# Patient Record
Sex: MALE | Race: WHITE | NOT HISPANIC OR LATINO | Employment: OTHER | URBAN - METROPOLITAN AREA
[De-identification: names, ages, dates, MRNs, and addresses within clinical notes are randomized per-mention and may not be internally consistent; named-entity substitution may affect disease eponyms.]

---

## 2023-04-21 DIAGNOSIS — E78.5 HYPERLIPIDEMIA, UNSPECIFIED HYPERLIPIDEMIA TYPE: Primary | ICD-10-CM

## 2023-04-21 RX ORDER — ACETAMINOPHEN 500 MG
1 TABLET ORAL DAILY
COMMUNITY
Start: 2022-10-24 | End: 2023-06-15

## 2023-04-21 RX ORDER — PALIPERIDONE PALMITATE 234 MG/1.5ML
INJECTION INTRAMUSCULAR
COMMUNITY
Start: 2019-10-15

## 2023-04-21 RX ORDER — LUMATEPERONE 42 MG/1
1 CAPSULE ORAL DAILY
COMMUNITY
Start: 2022-07-11

## 2023-04-21 RX ORDER — ATORVASTATIN CALCIUM 20 MG/1
1 TABLET, FILM COATED ORAL DAILY
COMMUNITY
Start: 2019-10-15 | End: 2023-04-21 | Stop reason: SDUPTHER

## 2023-04-21 RX ORDER — ZOLPIDEM TARTRATE 10 MG/1
10 TABLET ORAL NIGHTLY PRN
COMMUNITY

## 2023-04-21 RX ORDER — ATORVASTATIN CALCIUM 20 MG/1
TABLET, FILM COATED ORAL
Qty: 90 TABLET | Refills: 0 | Status: SHIPPED | OUTPATIENT
Start: 2023-04-21 | End: 2023-08-21

## 2023-04-21 RX ORDER — DOXYLAMINE SUCCINATE 25 MG
TABLET ORAL
COMMUNITY
Start: 2023-01-10

## 2023-04-21 RX ORDER — LORAZEPAM 0.5 MG/1
TABLET ORAL
COMMUNITY

## 2023-04-21 RX ORDER — ESOMEPRAZOLE MAGNESIUM 40 MG/1
40 CAPSULE, DELAYED RELEASE ORAL DAILY PRN
COMMUNITY
End: 2024-01-17

## 2023-04-21 RX ORDER — LURASIDONE HYDROCHLORIDE 120 MG/1
TABLET, FILM COATED ORAL
COMMUNITY
Start: 2023-03-28

## 2023-04-21 RX ORDER — IBUPROFEN 200 MG
TABLET ORAL
COMMUNITY
Start: 2023-01-11 | End: 2024-02-07 | Stop reason: ALTCHOICE

## 2023-06-14 DIAGNOSIS — E55.9 VITAMIN D INSUFFICIENCY: Primary | ICD-10-CM

## 2023-06-15 PROBLEM — R00.0 TACHYCARDIA: Status: ACTIVE | Noted: 2023-06-15

## 2023-06-15 PROBLEM — R63.4 ABNORMAL WEIGHT LOSS: Status: ACTIVE | Noted: 2023-06-15

## 2023-06-15 PROBLEM — D75.1 ERYTHROCYTOSIS: Status: ACTIVE | Noted: 2023-06-15

## 2023-06-15 PROBLEM — M79.662 BILATERAL CALF PAIN: Status: ACTIVE | Noted: 2023-06-15

## 2023-06-15 PROBLEM — R21 RASH AND OTHER NONSPECIFIC SKIN ERUPTION: Status: ACTIVE | Noted: 2023-06-15

## 2023-06-15 PROBLEM — F20.0: Status: ACTIVE | Noted: 2023-06-15

## 2023-06-15 PROBLEM — L02.419 ABSCESS OF LOWER EXTREMITY: Status: ACTIVE | Noted: 2023-06-15

## 2023-06-15 PROBLEM — K04.7 INFECTED TOOTH: Status: ACTIVE | Noted: 2023-06-15

## 2023-06-15 PROBLEM — E55.9 VITAMIN D INSUFFICIENCY: Status: ACTIVE | Noted: 2023-06-15

## 2023-06-15 PROBLEM — K21.00 GASTROESOPHAGEAL REFLUX DISEASE WITH ESOPHAGITIS: Status: ACTIVE | Noted: 2023-06-15

## 2023-06-15 PROBLEM — R74.8 ALKALINE PHOSPHATASE ELEVATION: Status: ACTIVE | Noted: 2023-06-15

## 2023-06-15 PROBLEM — D72.829 LEUKOCYTOSIS: Status: ACTIVE | Noted: 2023-06-15

## 2023-06-15 PROBLEM — M79.661 BILATERAL CALF PAIN: Status: ACTIVE | Noted: 2023-06-15

## 2023-06-15 PROBLEM — E78.5 HYPERLIPIDEMIA: Status: ACTIVE | Noted: 2023-06-15

## 2023-06-15 PROBLEM — M79.604 ACUTE PAIN OF RIGHT LOWER EXTREMITY: Status: ACTIVE | Noted: 2023-06-15

## 2023-06-15 PROBLEM — M71.20 POPLITEAL CYST: Status: ACTIVE | Noted: 2023-06-15

## 2023-06-15 PROBLEM — B35.1 ONYCHOMYCOSIS DUE TO DERMATOPHYTE: Status: ACTIVE | Noted: 2023-06-15

## 2023-06-15 RX ORDER — NICOTINE 11MG/24HR
PATCH, TRANSDERMAL 24 HOURS TRANSDERMAL
Qty: 90 CAPSULE | Refills: 1 | Status: SHIPPED | OUTPATIENT
Start: 2023-06-15 | End: 2023-07-03 | Stop reason: SDUPTHER

## 2023-07-03 ENCOUNTER — OFFICE VISIT (OUTPATIENT)
Dept: PRIMARY CARE | Facility: CLINIC | Age: 48
End: 2023-07-03
Payer: COMMERCIAL

## 2023-07-03 VITALS
BODY MASS INDEX: 22.62 KG/M2 | DIASTOLIC BLOOD PRESSURE: 75 MMHG | HEART RATE: 85 BPM | OXYGEN SATURATION: 99 % | SYSTOLIC BLOOD PRESSURE: 120 MMHG | HEIGHT: 72 IN | TEMPERATURE: 97.5 F | WEIGHT: 167 LBS

## 2023-07-03 DIAGNOSIS — E55.9 VITAMIN D INSUFFICIENCY: ICD-10-CM

## 2023-07-03 DIAGNOSIS — J44.9 OBSTRUCTIVE LUNG DISEASE (MULTI): ICD-10-CM

## 2023-07-03 DIAGNOSIS — F20.0: ICD-10-CM

## 2023-07-03 DIAGNOSIS — E78.5 HYPERLIPIDEMIA, UNSPECIFIED HYPERLIPIDEMIA TYPE: Primary | ICD-10-CM

## 2023-07-03 PROCEDURE — 99214 OFFICE O/P EST MOD 30 MIN: CPT | Performed by: INTERNAL MEDICINE

## 2023-07-03 RX ORDER — ACETAMINOPHEN 500 MG
50 TABLET ORAL DAILY
Qty: 90 CAPSULE | Refills: 1 | Status: SHIPPED | OUTPATIENT
Start: 2023-07-03 | End: 2024-02-07 | Stop reason: SDUPTHER

## 2023-07-03 SDOH — ECONOMIC STABILITY: FOOD INSECURITY: WITHIN THE PAST 12 MONTHS, YOU WORRIED THAT YOUR FOOD WOULD RUN OUT BEFORE YOU GOT MONEY TO BUY MORE.: NEVER TRUE

## 2023-07-03 SDOH — ECONOMIC STABILITY: FOOD INSECURITY: WITHIN THE PAST 12 MONTHS, THE FOOD YOU BOUGHT JUST DIDN'T LAST AND YOU DIDN'T HAVE MONEY TO GET MORE.: NEVER TRUE

## 2023-07-03 ASSESSMENT — PATIENT HEALTH QUESTIONNAIRE - PHQ9
2. FEELING DOWN, DEPRESSED OR HOPELESS: NOT AT ALL
SUM OF ALL RESPONSES TO PHQ9 QUESTIONS 1 & 2: 0
1. LITTLE INTEREST OR PLEASURE IN DOING THINGS: NOT AT ALL

## 2023-07-03 ASSESSMENT — LIFESTYLE VARIABLES
AUDIT-C TOTAL SCORE: 0
SKIP TO QUESTIONS 9-10: 1
HOW MANY STANDARD DRINKS CONTAINING ALCOHOL DO YOU HAVE ON A TYPICAL DAY: PATIENT DOES NOT DRINK
HOW OFTEN DO YOU HAVE SIX OR MORE DRINKS ON ONE OCCASION: NEVER
HOW OFTEN DO YOU HAVE A DRINK CONTAINING ALCOHOL: NEVER

## 2023-07-03 ASSESSMENT — PAIN SCALES - GENERAL: PAINLEVEL: 0-NO PAIN

## 2023-07-03 NOTE — PROGRESS NOTES
Chief Complaint/HPI:    hyperlipidemia: takes Lipitor daily, refills requested      schizophrenia: gets meds at [facility]. He takes Invega, Caplyta, lorazepam and Ambien, psychiatrist has discussed use of energy drinks also. Patient still smokes, he does admit to continuing use of energy drinks    Patient denies any breathing issues, he is due for screening colonoscopy. I advised the patient that his mother was concerned about lung issues since he has smoked for many years, he still smokes 1 1/2 ppd      GERD: patient takes esomeprazole, he smokes, he has no issues today and reports improvement in his acid reflux, refills requested        ROS otherwise negative aside from what was mentioned above in HPI.      Patient Active Problem List   Diagnosis    Abnormal weight loss    Abscess of lower extremity    Acute pain of right lower extremity    Alkaline phosphatase elevation    Bilateral calf pain    Erythrocytosis    Gastroesophageal reflux disease with esophagitis    Hyperlipidemia    Infected tooth    Leukocytosis    Onychomycosis due to dermatophyte    Paranoid type schizophrenia (CMS/HCC)    Popliteal cyst    Rash and other nonspecific skin eruption    Tachycardia    Vitamin D insufficiency    Obstructive lung disease (CMS/HCC)         Past Medical History:   Diagnosis Date    Schizophrenia, unspecified (CMS/HCC)     Schizophrenic disorder     Past Surgical History:   Procedure Laterality Date    OTHER SURGICAL HISTORY  10/15/2019    Hand surgery    OTHER SURGICAL HISTORY  10/15/2019    Brain surgery     Social History     Social History Narrative    Not on file         ALLERGIES  Doxycycline and Sulfamethoxazole-trimethoprim      MEDICATIONS  Current Outpatient Medications on File Prior to Visit   Medication Sig Dispense Refill    atorvastatin (Lipitor) 20 mg tablet TAKE 1 TABLET BY MOUTH DAILY 90 tablet 0    Caplyta 42 mg capsule Take 1 capsule (42 mg) by mouth once daily.      esomeprazole (NexIUM) 40 mg  DR capsule Take 1 capsule (40 mg) by mouth once daily as needed.      Invega Sustenna 234 mg/1.5 mL syringe Inject into the shoulder, thigh, or buttocks.      LORazepam (Ativan) 0.5 mg tablet TAKE 1 TABLET BY MOUTH ONCE A DAY AS NEEDED FOR ANXIETY      lurasidone (Latuda) 120 mg tablet TAKE 1 TABLET BY MOUTH EVERY DAY WITH MEALS      miconazole (Micotin) 2 % cream APPLY SPARINGLY TOPICALLY TO THE AFFECTED AREA TWICE DAILY      zolpidem (Ambien) 10 mg tablet Take 1 tablet (10 mg) by mouth as needed at bedtime for sleep.      [DISCONTINUED] Vitamin D3 50 mcg (2,000 unit) capsule TAKE 1 CAPSULE BY MOUTH DAILY 90 capsule 1    nicotine (Nicoderm CQ) 21 mg/24 hr patch APPLY 1 PATCH TOPICALLY TO THE SKIN DAILY AS DIRECTED       No current facility-administered medications on file prior to visit.         PHYSICAL EXAM  /75 (BP Location: Right arm, Patient Position: Sitting, BP Cuff Size: Adult)   Pulse 85   Temp 36.4 °C (97.5 °F) (Temporal)   Ht 1.829 m (6')   Wt 75.8 kg (167 lb)   SpO2 99%   BMI 22.65 kg/m²   Body mass index is 22.65 kg/m².  CONSTITUTIONAL - looks older than stated age, not in acute distress, odor of smoke noted     HEAD - no trauma, normocephalic      EYES - extraocular muscles are intact, infraorbital edema bilaterally     Ears/ NECK - supple without rigidity, no neck mass was observed, no thyromegaly or thyroid nodules,  auricles are intact     CHEST - clear to auscultation, diminished breath sounds throughout, no wheezing, no crackles and no rales, good effort     CARDIAC -tachycardic with normal tye, no skipped beats, no murmur     EXTREMITIES - no edema, ring shaped rash with central clearing on left anterior lower leg, lesion is about 3 cm in diameter, right index fingernail burnt / brown appearing (cigarette smoking per patient)     NEUROLOGICAL - alert, oriented and no focal signs     PSYCHIATRIC - alert, but anxious, mood and affect appear congruent      IMMUNOLOGIC - no cervical  lymphadenopathy   ASSESSMENT/PLAN  Problem List Items Addressed This Visit       Hyperlipidemia - Primary    Relevant Orders    Comprehensive metabolic panel    Lipid panel    Paranoid type schizophrenia (CMS/HCC)    Current Assessment & Plan     Follow up with psychiatry as directed, encourage avoidance of use of energy drinks         Relevant Orders    Comprehensive metabolic panel    Vitamin D insufficiency    Relevant Medications    cholecalciferol (Vitamin D3) 50 mcg (2,000 unit) capsule    Other Relevant Orders    Comprehensive metabolic panel    Vitamin D 25-Hydroxy,Total    Obstructive lung disease (CMS/HCC)    Current Assessment & Plan     Patient does have a long smoking history, he does have diminished breath sounds consistent with obstructive lung disease, check PFTs and a chest x ray, he is too young to qualify for lung cancer screening, he has at least a 45 pack year history of smoking at this time (1.5 ppd x 30 years). Will order screening lung CT when the patient is 52 years old, advise smoking cessation again         Relevant Orders    Comprehensive metabolic panel    Pulmonary function testing (Ancillary Performed)    XR chest 2 views     Check labs, PFTs , CXR, recommend getting screening colonoscopy completed, encourage avoidance of energy drinks, encourage smoking cessation.     Follow up in about 6 months   Patrick Anne MD

## 2023-07-03 NOTE — ASSESSMENT & PLAN NOTE
Patient does have a long smoking history, he does have diminished breath sounds consistent with obstructive lung disease, check PFTs and a chest x ray, he is too young to qualify for lung cancer screening, he has at least a 45 pack year history of smoking at this time (1.5 ppd x 30 years). Will order screening lung CT when the patient is 52 years old, advise smoking cessation again

## 2023-07-21 ENCOUNTER — LAB (OUTPATIENT)
Dept: LAB | Facility: LAB | Age: 48
End: 2023-07-21
Payer: COMMERCIAL

## 2023-07-21 DIAGNOSIS — F20.0: ICD-10-CM

## 2023-07-21 DIAGNOSIS — J44.9 OBSTRUCTIVE LUNG DISEASE (MULTI): ICD-10-CM

## 2023-07-21 DIAGNOSIS — S22.000A COMPRESSION FRACTURE OF THORACIC VERTEBRA, UNSPECIFIED THORACIC VERTEBRAL LEVEL, INITIAL ENCOUNTER (MULTI): Primary | ICD-10-CM

## 2023-07-21 DIAGNOSIS — E55.9 VITAMIN D INSUFFICIENCY: ICD-10-CM

## 2023-07-21 DIAGNOSIS — E78.5 HYPERLIPIDEMIA, UNSPECIFIED HYPERLIPIDEMIA TYPE: ICD-10-CM

## 2023-07-21 LAB
ALANINE AMINOTRANSFERASE (SGPT) (U/L) IN SER/PLAS: 14 U/L (ref 10–52)
ALBUMIN (G/DL) IN SER/PLAS: 4.1 G/DL (ref 3.4–5)
ALKALINE PHOSPHATASE (U/L) IN SER/PLAS: 108 U/L (ref 33–120)
ANION GAP IN SER/PLAS: 10 MMOL/L (ref 10–20)
ASPARTATE AMINOTRANSFERASE (SGOT) (U/L) IN SER/PLAS: 13 U/L (ref 9–39)
BILIRUBIN TOTAL (MG/DL) IN SER/PLAS: 0.3 MG/DL (ref 0–1.2)
CALCIDIOL (25 OH VITAMIN D3) (NG/ML) IN SER/PLAS: 36 NG/ML
CALCIUM (MG/DL) IN SER/PLAS: 8.9 MG/DL (ref 8.6–10.3)
CARBON DIOXIDE, TOTAL (MMOL/L) IN SER/PLAS: 29 MMOL/L (ref 21–32)
CHLORIDE (MMOL/L) IN SER/PLAS: 104 MMOL/L (ref 98–107)
CHOLESTEROL (MG/DL) IN SER/PLAS: 119 MG/DL (ref 0–199)
CHOLESTEROL IN HDL (MG/DL) IN SER/PLAS: 34.7 MG/DL
CHOLESTEROL/HDL RATIO: 3.4
CREATININE (MG/DL) IN SER/PLAS: 0.82 MG/DL (ref 0.5–1.3)
GFR MALE: >90 ML/MIN/1.73M2
GLUCOSE (MG/DL) IN SER/PLAS: 74 MG/DL (ref 74–99)
LDL: 57 MG/DL (ref 0–99)
POTASSIUM (MMOL/L) IN SER/PLAS: 4 MMOL/L (ref 3.5–5.3)
PROTEIN TOTAL: 6.3 G/DL (ref 6.4–8.2)
SODIUM (MMOL/L) IN SER/PLAS: 139 MMOL/L (ref 136–145)
TRIGLYCERIDE (MG/DL) IN SER/PLAS: 139 MG/DL (ref 0–149)
UREA NITROGEN (MG/DL) IN SER/PLAS: 5 MG/DL (ref 6–23)
VLDL: 28 MG/DL (ref 0–40)

## 2023-07-21 PROCEDURE — 80061 LIPID PANEL: CPT

## 2023-07-21 PROCEDURE — 80053 COMPREHEN METABOLIC PANEL: CPT

## 2023-07-21 PROCEDURE — 82306 VITAMIN D 25 HYDROXY: CPT

## 2023-07-21 PROCEDURE — 36415 COLL VENOUS BLD VENIPUNCTURE: CPT

## 2023-08-07 ENCOUNTER — TELEPHONE (OUTPATIENT)
Dept: SCHEDULING | Age: 48
End: 2023-08-07

## 2023-08-21 DIAGNOSIS — E78.5 HYPERLIPIDEMIA, UNSPECIFIED HYPERLIPIDEMIA TYPE: ICD-10-CM

## 2023-08-21 RX ORDER — IBUPROFEN 800 MG/1
800 TABLET ORAL EVERY 6 HOURS PRN
COMMUNITY
Start: 2023-08-03

## 2023-08-21 RX ORDER — ATORVASTATIN CALCIUM 20 MG/1
TABLET, FILM COATED ORAL
Qty: 90 TABLET | Refills: 0 | Status: SHIPPED | OUTPATIENT
Start: 2023-08-21 | End: 2023-12-28

## 2023-11-20 ENCOUNTER — TELEPHONE (OUTPATIENT)
Dept: PRIMARY CARE | Facility: CLINIC | Age: 48
End: 2023-11-20
Payer: COMMERCIAL

## 2023-11-20 NOTE — TELEPHONE ENCOUNTER
Patient is calling for a new script for AMOXICILLIN FOR DENTAL PROBLEMS    Patient states Dr HARPER has filled this in the past.    [id number] - [place], [place] - [address] AT [facility]

## 2023-11-28 NOTE — TELEPHONE ENCOUNTER
Talked with Jessy Watson and she said that he no longer will need the Amoxicillin.  He was just wanted it in case  an infection did occur.

## 2023-12-27 DIAGNOSIS — E78.5 HYPERLIPIDEMIA, UNSPECIFIED HYPERLIPIDEMIA TYPE: ICD-10-CM

## 2023-12-28 RX ORDER — ATORVASTATIN CALCIUM 20 MG/1
TABLET, FILM COATED ORAL
Qty: 90 TABLET | Refills: 0 | Status: SHIPPED | OUTPATIENT
Start: 2023-12-28 | End: 2024-04-23

## 2024-01-02 ENCOUNTER — APPOINTMENT (OUTPATIENT)
Dept: PRIMARY CARE | Facility: CLINIC | Age: 49
End: 2024-01-02
Payer: COMMERCIAL

## 2024-01-03 ENCOUNTER — APPOINTMENT (OUTPATIENT)
Dept: PRIMARY CARE | Facility: CLINIC | Age: 49
End: 2024-01-03
Payer: COMMERCIAL

## 2024-01-17 DIAGNOSIS — K21.00 GASTROESOPHAGEAL REFLUX DISEASE WITH ESOPHAGITIS, UNSPECIFIED WHETHER HEMORRHAGE: Primary | ICD-10-CM

## 2024-01-17 RX ORDER — ESOMEPRAZOLE MAGNESIUM 40 MG/1
40 CAPSULE, DELAYED RELEASE ORAL DAILY PRN
Qty: 90 CAPSULE | Refills: 1 | Status: SHIPPED | OUTPATIENT
Start: 2024-01-17

## 2024-02-07 ENCOUNTER — OFFICE VISIT (OUTPATIENT)
Dept: PRIMARY CARE | Facility: CLINIC | Age: 49
End: 2024-02-07
Payer: COMMERCIAL

## 2024-02-07 VITALS
BODY MASS INDEX: 22.75 KG/M2 | RESPIRATION RATE: 16 BRPM | HEART RATE: 127 BPM | DIASTOLIC BLOOD PRESSURE: 81 MMHG | WEIGHT: 168 LBS | HEIGHT: 72 IN | SYSTOLIC BLOOD PRESSURE: 131 MMHG | TEMPERATURE: 97.6 F | OXYGEN SATURATION: 97 %

## 2024-02-07 DIAGNOSIS — E55.9 VITAMIN D INSUFFICIENCY: ICD-10-CM

## 2024-02-07 DIAGNOSIS — Z12.11 COLON CANCER SCREENING: ICD-10-CM

## 2024-02-07 DIAGNOSIS — E78.5 HYPERLIPIDEMIA, UNSPECIFIED HYPERLIPIDEMIA TYPE: Primary | ICD-10-CM

## 2024-02-07 DIAGNOSIS — R00.0 TACHYCARDIA: ICD-10-CM

## 2024-02-07 DIAGNOSIS — J44.9 OBSTRUCTIVE LUNG DISEASE (MULTI): ICD-10-CM

## 2024-02-07 DIAGNOSIS — F20.0: ICD-10-CM

## 2024-02-07 PROCEDURE — 4004F PT TOBACCO SCREEN RCVD TLK: CPT | Performed by: INTERNAL MEDICINE

## 2024-02-07 PROCEDURE — G0009 ADMIN PNEUMOCOCCAL VACCINE: HCPCS | Performed by: INTERNAL MEDICINE

## 2024-02-07 PROCEDURE — 90715 TDAP VACCINE 7 YRS/> IM: CPT | Performed by: INTERNAL MEDICINE

## 2024-02-07 PROCEDURE — 90471 IMMUNIZATION ADMIN: CPT | Performed by: INTERNAL MEDICINE

## 2024-02-07 PROCEDURE — 99214 OFFICE O/P EST MOD 30 MIN: CPT | Performed by: INTERNAL MEDICINE

## 2024-02-07 PROCEDURE — 90677 PCV20 VACCINE IM: CPT | Performed by: INTERNAL MEDICINE

## 2024-02-07 RX ORDER — ACETAMINOPHEN 500 MG
2000 TABLET ORAL DAILY
Qty: 90 CAPSULE | Refills: 1 | Status: SHIPPED | OUTPATIENT
Start: 2024-02-07

## 2024-02-07 SDOH — ECONOMIC STABILITY: FOOD INSECURITY: WITHIN THE PAST 12 MONTHS, YOU WORRIED THAT YOUR FOOD WOULD RUN OUT BEFORE YOU GOT MONEY TO BUY MORE.: NEVER TRUE

## 2024-02-07 SDOH — ECONOMIC STABILITY: FOOD INSECURITY: WITHIN THE PAST 12 MONTHS, THE FOOD YOU BOUGHT JUST DIDN'T LAST AND YOU DIDN'T HAVE MONEY TO GET MORE.: NEVER TRUE

## 2024-02-07 ASSESSMENT — LIFESTYLE VARIABLES
HOW MANY STANDARD DRINKS CONTAINING ALCOHOL DO YOU HAVE ON A TYPICAL DAY: PATIENT DOES NOT DRINK
HOW OFTEN DO YOU HAVE A DRINK CONTAINING ALCOHOL: NEVER
AUDIT-C TOTAL SCORE: 0
HOW OFTEN DO YOU HAVE SIX OR MORE DRINKS ON ONE OCCASION: NEVER
SKIP TO QUESTIONS 9-10: 1

## 2024-02-07 ASSESSMENT — PATIENT HEALTH QUESTIONNAIRE - PHQ9
1. LITTLE INTEREST OR PLEASURE IN DOING THINGS: NOT AT ALL
SUM OF ALL RESPONSES TO PHQ9 QUESTIONS 1 & 2: 0
2. FEELING DOWN, DEPRESSED OR HOPELESS: NOT AT ALL

## 2024-02-07 NOTE — ASSESSMENT & PLAN NOTE
Patient goes to [facility], he takes multiple meds as noted. Again encourage the patient to decrease consumption of energy drinks, he is tachycardic. Will check thyroid studies also

## 2024-02-07 NOTE — PROGRESS NOTES
"Chief Complaint/HPI:    hyperlipidemia: takes Lipitor daily,      Vitamin d insufficiency: patient takes vitamin d     schizophrenia: gets meds at [facility]. He takes Invega, Caplyta, lorazepam, Latuda,  and Ambien, psychiatrist has discussed use of energy drinks also. Patient still smokes, he does admit to continuing use of energy drinks . Patient states that \"smoking calms me down\"     Patient denies any breathing issues, he is due for screening colonoscopy. I advised the patient that his mother was concerned about lung issues since he has smoked for many years, he still smokes 1 1/2 ppd      GERD: patient takes esomeprazole, he smokes, he has no issues today and reports improvement in his acid reflux    ROS otherwise negative aside from what was mentioned above in HPI.      Patient Active Problem List   Diagnosis    Abnormal weight loss    Abscess of lower extremity    Acute pain of right lower extremity    Alkaline phosphatase elevation    Bilateral calf pain    Erythrocytosis    Gastroesophageal reflux disease with esophagitis    Hyperlipidemia    Infected tooth    Leukocytosis    Onychomycosis due to dermatophyte    Paranoid type schizophrenia (CMS/HCC)    Popliteal cyst    Rash and other nonspecific skin eruption    Tachycardia    Vitamin D insufficiency    Obstructive lung disease (CMS/HCC)         Past Medical History:   Diagnosis Date    Schizophrenia, unspecified (CMS/HCC)     Schizophrenic disorder     Past Surgical History:   Procedure Laterality Date    OTHER SURGICAL HISTORY  10/15/2019    Hand surgery    OTHER SURGICAL HISTORY  10/15/2019    Brain surgery     Social History     Social History Narrative    Not on file         ALLERGIES  Doxycycline and Sulfamethoxazole-trimethoprim      MEDICATIONS  Current Outpatient Medications on File Prior to Visit   Medication Sig Dispense Refill    atorvastatin (Lipitor) 20 mg tablet TAKE 1 TABLET BY MOUTH DAILY 90 tablet 0    Caplyta 42 mg capsule Take 1 " capsule (42 mg) by mouth once daily.      esomeprazole (NexIUM) 40 mg DR capsule TAKE 1 CAPSULE BY MOUTH DAILY AS NEEDED 90 capsule 1    ibuprofen 800 mg tablet Take 1 tablet (800 mg) by mouth every 6 hours if needed for mild pain (1 - 3) or moderate pain (4 - 6).      Invega Sustenna 234 mg/1.5 mL syringe Inject into the shoulder, thigh, or buttocks.      LORazepam (Ativan) 0.5 mg tablet TAKE 1 TABLET BY MOUTH ONCE A DAY AS NEEDED FOR ANXIETY      lurasidone (Latuda) 120 mg tablet TAKE 1 TABLET BY MOUTH EVERY DAY WITH MEALS      miconazole (Micotin) 2 % cream APPLY SPARINGLY TOPICALLY TO THE AFFECTED AREA TWICE DAILY      zolpidem (Ambien) 10 mg tablet Take 1 tablet (10 mg) by mouth as needed at bedtime for sleep.      [DISCONTINUED] cholecalciferol (Vitamin D3) 50 mcg (2,000 unit) capsule Take 1 capsule (50 mcg) by mouth once daily. 90 capsule 1    [DISCONTINUED] nicotine (Nicoderm CQ) 21 mg/24 hr patch APPLY 1 PATCH TOPICALLY TO THE SKIN DAILY AS DIRECTED       No current facility-administered medications on file prior to visit.         PHYSICAL EXAM  /81 (BP Location: Left arm, Patient Position: Sitting, BP Cuff Size: Adult)   Pulse (!) 127   Temp 36.4 °C (97.6 °F)   Resp 16   Ht 1.829 m (6')   Wt 76.2 kg (168 lb)   SpO2 97%   BMI 22.78 kg/m²   Body mass index is 22.78 kg/m².  CONSTITUTIONAL - looks older than stated age, not in acute distress, odor of smoke noted, he moves constantly about the exam room today, patient does talk to himself during the exam     HEAD - no trauma, normocephalic      EYES - extraocular muscles are intact      Ears/ NECK - supple without rigidity, no neck mass was observed, no thyromegaly or thyroid nodules,  auricles are intact     CHEST - clear to auscultation, diminished breath sounds throughout, no wheezing, no crackles and no rales, good effort     CARDIAC -tachycardic with normal rhythm, no skipped beats, no murmur noted     EXTREMITIES - no edema, right index  fingernail  / brown appearing (cigarette smoking per patient)     NEUROLOGICAL - alert, oriented and no focal signs, patellar reflexes are 3+ bilaterally     PSYCHIATRIC - alert, but anxious, he moves frequently about in the exam room     IMMUNOLOGIC - no cervical lymphadenopathy     ASSESSMENT/PLAN  Problem List Items Addressed This Visit       Hyperlipidemia - Primary    Current Assessment & Plan     Continue atorvastatin, check labs          Relevant Orders    Albumin , Urine Random    CBC and Auto Differential    Comprehensive Metabolic Panel    Lipid Panel    Paranoid type schizophrenia (CMS/HCC)    Current Assessment & Plan     Patient goes to [facility], he takes multiple meds as noted. Again encourage the patient to decrease consumption of energy drinks, he is tachycardic. Will check thyroid studies also         Relevant Orders    TSH with reflex to Free T4 if abnormal    Albumin , Urine Random    CBC and Auto Differential    Comprehensive Metabolic Panel    Tachycardia    Current Assessment & Plan     Check TSH, check CMP and CBC also         Relevant Orders    TSH with reflex to Free T4 if abnormal    Albumin , Urine Random    CBC and Auto Differential    Comprehensive Metabolic Panel    Vitamin D insufficiency    Current Assessment & Plan     Continue vitamin d, check labs         Relevant Medications    cholecalciferol (Vitamin D3) 50 mcg (2,000 unit) capsule    Other Relevant Orders    Vitamin D 25-Hydroxy,Total (for eval of Vitamin D levels)    Albumin , Urine Random    CBC and Auto Differential    Comprehensive Metabolic Panel    Obstructive lung disease (CMS/HCC)    Current Assessment & Plan     He continues to smoke, again recommend smoking cessation         Relevant Orders    Albumin , Urine Random    CBC and Auto Differential    Comprehensive Metabolic Panel     Other Visit Diagnoses       Colon cancer screening        Relevant Orders    Colonoscopy Screening; Average Risk Patient           Check labs, recommend screening colonoscopy, this may be difficult for the patient due to current psychiatric issues. TDAP and Prevnar 20 vaccines ordered    Patrick Anne MD

## 2024-03-29 ENCOUNTER — LAB (OUTPATIENT)
Dept: LAB | Facility: LAB | Age: 49
End: 2024-03-29
Payer: COMMERCIAL

## 2024-03-29 DIAGNOSIS — J44.9 OBSTRUCTIVE LUNG DISEASE (MULTI): ICD-10-CM

## 2024-03-29 DIAGNOSIS — E55.9 VITAMIN D INSUFFICIENCY: ICD-10-CM

## 2024-03-29 DIAGNOSIS — R00.0 TACHYCARDIA: ICD-10-CM

## 2024-03-29 DIAGNOSIS — E78.5 HYPERLIPIDEMIA, UNSPECIFIED HYPERLIPIDEMIA TYPE: ICD-10-CM

## 2024-03-29 DIAGNOSIS — F20.0: ICD-10-CM

## 2024-03-29 LAB
25(OH)D3 SERPL-MCNC: 43 NG/ML (ref 30–100)
ALBUMIN SERPL BCP-MCNC: 4.6 G/DL (ref 3.4–5)
ALP SERPL-CCNC: 120 U/L (ref 33–120)
ALT SERPL W P-5'-P-CCNC: 14 U/L (ref 10–52)
ANION GAP SERPL CALC-SCNC: 12 MMOL/L (ref 10–20)
AST SERPL W P-5'-P-CCNC: 14 U/L (ref 9–39)
BASOPHILS # BLD AUTO: 0.02 X10*3/UL (ref 0–0.1)
BASOPHILS NFR BLD AUTO: 0.1 %
BILIRUB SERPL-MCNC: 0.6 MG/DL (ref 0–1.2)
BUN SERPL-MCNC: 6 MG/DL (ref 6–23)
CALCIUM SERPL-MCNC: 9.5 MG/DL (ref 8.6–10.3)
CHLORIDE SERPL-SCNC: 102 MMOL/L (ref 98–107)
CHOLEST SERPL-MCNC: 130 MG/DL (ref 0–199)
CHOLESTEROL/HDL RATIO: 4.3
CO2 SERPL-SCNC: 28 MMOL/L (ref 21–32)
CREAT SERPL-MCNC: 0.81 MG/DL (ref 0.5–1.3)
CREAT UR-MCNC: 114.6 MG/DL (ref 20–370)
EGFRCR SERPLBLD CKD-EPI 2021: >90 ML/MIN/1.73M*2
EOSINOPHIL # BLD AUTO: 0.05 X10*3/UL (ref 0–0.7)
EOSINOPHIL NFR BLD AUTO: 0.3 %
ERYTHROCYTE [DISTWIDTH] IN BLOOD BY AUTOMATED COUNT: 13.1 % (ref 11.5–14.5)
GLUCOSE SERPL-MCNC: 107 MG/DL (ref 74–99)
HCT VFR BLD AUTO: 53 % (ref 41–52)
HDLC SERPL-MCNC: 30.4 MG/DL
HGB BLD-MCNC: 18.3 G/DL (ref 13.5–17.5)
IMM GRANULOCYTES # BLD AUTO: 0.07 X10*3/UL (ref 0–0.7)
IMM GRANULOCYTES NFR BLD AUTO: 0.5 % (ref 0–0.9)
LDLC SERPL CALC-MCNC: 73 MG/DL
LYMPHOCYTES # BLD AUTO: 2.31 X10*3/UL (ref 1.2–4.8)
LYMPHOCYTES NFR BLD AUTO: 14.9 %
MCH RBC QN AUTO: 31.3 PG (ref 26–34)
MCHC RBC AUTO-ENTMCNC: 34.5 G/DL (ref 32–36)
MCV RBC AUTO: 91 FL (ref 80–100)
MICROALBUMIN UR-MCNC: 8.7 MG/L
MICROALBUMIN/CREAT UR: 7.6 UG/MG CREAT
MONOCYTES # BLD AUTO: 1.19 X10*3/UL (ref 0.1–1)
MONOCYTES NFR BLD AUTO: 7.7 %
NEUTROPHILS # BLD AUTO: 11.82 X10*3/UL (ref 1.2–7.7)
NEUTROPHILS NFR BLD AUTO: 76.5 %
NON HDL CHOLESTEROL: 100 MG/DL (ref 0–149)
NRBC BLD-RTO: 0 /100 WBCS (ref 0–0)
PLATELET # BLD AUTO: 309 X10*3/UL (ref 150–450)
POTASSIUM SERPL-SCNC: 4.1 MMOL/L (ref 3.5–5.3)
PROT SERPL-MCNC: 7.3 G/DL (ref 6.4–8.2)
RBC # BLD AUTO: 5.84 X10*6/UL (ref 4.5–5.9)
SODIUM SERPL-SCNC: 138 MMOL/L (ref 136–145)
TRIGL SERPL-MCNC: 133 MG/DL (ref 0–149)
TSH SERPL-ACNC: 1.11 MIU/L (ref 0.44–3.98)
VLDL: 27 MG/DL (ref 0–40)
WBC # BLD AUTO: 15.5 X10*3/UL (ref 4.4–11.3)

## 2024-03-29 PROCEDURE — 82043 UR ALBUMIN QUANTITATIVE: CPT

## 2024-03-29 PROCEDURE — 82570 ASSAY OF URINE CREATININE: CPT

## 2024-03-29 PROCEDURE — 80061 LIPID PANEL: CPT

## 2024-03-29 PROCEDURE — 84443 ASSAY THYROID STIM HORMONE: CPT

## 2024-03-29 PROCEDURE — 36415 COLL VENOUS BLD VENIPUNCTURE: CPT

## 2024-03-29 PROCEDURE — 85025 COMPLETE CBC W/AUTO DIFF WBC: CPT

## 2024-03-29 PROCEDURE — 82306 VITAMIN D 25 HYDROXY: CPT

## 2024-03-29 PROCEDURE — 80053 COMPREHEN METABOLIC PANEL: CPT

## 2024-04-22 DIAGNOSIS — E78.5 HYPERLIPIDEMIA, UNSPECIFIED HYPERLIPIDEMIA TYPE: ICD-10-CM

## 2024-04-23 RX ORDER — ATORVASTATIN CALCIUM 20 MG/1
TABLET, FILM COATED ORAL
Qty: 90 TABLET | Refills: 0 | Status: SHIPPED | OUTPATIENT
Start: 2024-04-23

## 2024-05-21 ENCOUNTER — TELEPHONE (OUTPATIENT)
Dept: GASTROENTEROLOGY | Facility: CLINIC | Age: 49
End: 2024-05-21
Payer: COMMERCIAL

## 2024-05-21 NOTE — TELEPHONE ENCOUNTER
----- Message from Cassie Galvez MA sent at 5/21/2024  9:28 AM EDT -----  Regarding: RE: OA approved  FEMALE STATED THE PATIENT WOULD PROBABLY NOT SCHEDULE COLONOSCOPY.  ----- Message -----  From: Cassie Galvez MA  Sent: 5/16/2024   2:20 PM EDT  To: Do [id number] [facility] Clerical  Subject: RE: OA approved                                  Left message WITH FEMALE to return call     ----- Message -----  From: Cassie Galvez MA  Sent: 5/10/2024  10:34 AM EDT  To: Do [id number] [facility] Clerical  Subject: RE: OA approved                                  Left message on machine to return call     ----- Message -----  From: Maria Esther Monroe MA  Sent: 5/8/2024   1:33 PM EDT  To: [place]; Do [id number] [facility] Clerical  Subject: OA approved                                      Pt was OA approved in February - no message ever sent to GI.

## 2024-08-08 ENCOUNTER — APPOINTMENT (OUTPATIENT)
Dept: PRIMARY CARE | Facility: CLINIC | Age: 49
End: 2024-08-08
Payer: COMMERCIAL

## 2024-08-16 DIAGNOSIS — E78.5 HYPERLIPIDEMIA, UNSPECIFIED HYPERLIPIDEMIA TYPE: ICD-10-CM

## 2024-08-16 RX ORDER — ATORVASTATIN CALCIUM 20 MG/1
TABLET, FILM COATED ORAL
Qty: 90 TABLET | Refills: 0 | Status: SHIPPED | OUTPATIENT
Start: 2024-08-16

## 2024-09-10 DIAGNOSIS — E55.9 VITAMIN D INSUFFICIENCY: ICD-10-CM

## 2024-09-10 DIAGNOSIS — K21.00 GASTROESOPHAGEAL REFLUX DISEASE WITH ESOPHAGITIS, UNSPECIFIED WHETHER HEMORRHAGE: ICD-10-CM

## 2024-09-10 RX ORDER — ACETAMINOPHEN 500 MG
2000 TABLET ORAL DAILY
Qty: 90 CAPSULE | Refills: 1 | Status: SHIPPED | OUTPATIENT
Start: 2024-09-10

## 2024-09-10 RX ORDER — ESOMEPRAZOLE MAGNESIUM 40 MG/1
40 CAPSULE, DELAYED RELEASE ORAL
Qty: 90 CAPSULE | Refills: 1 | Status: SHIPPED | OUTPATIENT
Start: 2024-09-10

## 2024-10-15 ENCOUNTER — APPOINTMENT (OUTPATIENT)
Dept: PRIMARY CARE | Facility: CLINIC | Age: 49
End: 2024-10-15
Payer: COMMERCIAL

## 2024-11-21 ENCOUNTER — TELEPHONE (OUTPATIENT)
Dept: PHARMACY | Facility: HOSPITAL | Age: 49
End: 2024-11-21
Payer: COMMERCIAL

## 2024-11-21 NOTE — TELEPHONE ENCOUNTER
I reviewed the progress note and agree with the resident’s findings and plans as written. Case discussed with resident.    Khushbu Diaz, MarcialD

## 2024-11-21 NOTE — TELEPHONE ENCOUNTER
Population Health: Outreach by Ambulatory Pharmacy Team    Patient: Arnold Us  Primary Care Provider (PCP): Patrick Anne MD  Payor: [facility]  Reason: Adherence  Medication(s): atorvastatin 20mg tablet  Outcome: Patient Reached: Claims Adherence, patient's mom reports patient is adherent to the med. Per chart, patient has no refill remaining, refill request message is sent to patient's PCP.      Christopher Rosales RP

## 2024-12-19 DIAGNOSIS — E78.5 HYPERLIPIDEMIA, UNSPECIFIED HYPERLIPIDEMIA TYPE: ICD-10-CM

## 2024-12-19 RX ORDER — ATORVASTATIN CALCIUM 20 MG/1
20 TABLET, FILM COATED ORAL DAILY
Qty: 90 TABLET | Refills: 0 | Status: SHIPPED | OUTPATIENT
Start: 2024-12-19

## 2025-01-16 ENCOUNTER — APPOINTMENT (OUTPATIENT)
Dept: PRIMARY CARE | Facility: CLINIC | Age: 50
End: 2025-01-16
Payer: COMMERCIAL

## 2025-02-11 ENCOUNTER — APPOINTMENT (OUTPATIENT)
Dept: PRIMARY CARE | Facility: CLINIC | Age: 50
End: 2025-02-11
Payer: COMMERCIAL

## 2025-05-01 ENCOUNTER — APPOINTMENT (OUTPATIENT)
Dept: PRIMARY CARE | Facility: CLINIC | Age: 50
End: 2025-05-01
Payer: COMMERCIAL

## 2025-05-13 DIAGNOSIS — K21.00 GASTROESOPHAGEAL REFLUX DISEASE WITH ESOPHAGITIS, UNSPECIFIED WHETHER HEMORRHAGE: ICD-10-CM

## 2025-05-13 RX ORDER — ESOMEPRAZOLE MAGNESIUM 40 MG/1
40 CAPSULE, DELAYED RELEASE ORAL
Qty: 90 CAPSULE | Refills: 1 | Status: SHIPPED | OUTPATIENT
Start: 2025-05-13

## 2025-05-13 NOTE — TELEPHONE ENCOUNTER
esomeprazole (NexIUM) 40 mg DR capsule     [facility] #[id number] - [place], [place] - [address] AT [facility] OF [address]  [address] [place] [address] [place] [address]  Phone: [phone]  Fax: [phone]  RIVER #: [id number]

## 2025-08-07 ENCOUNTER — APPOINTMENT (OUTPATIENT)
Dept: PRIMARY CARE | Facility: CLINIC | Age: 50
End: 2025-08-07
Payer: COMMERCIAL

## 2025-08-14 DIAGNOSIS — E78.5 HYPERLIPIDEMIA, UNSPECIFIED HYPERLIPIDEMIA TYPE: ICD-10-CM

## 2025-08-14 DIAGNOSIS — R74.8 ALKALINE PHOSPHATASE ELEVATION: Primary | ICD-10-CM

## 2025-08-14 DIAGNOSIS — E55.9 VITAMIN D INSUFFICIENCY: ICD-10-CM

## 2025-08-14 DIAGNOSIS — F20.0: ICD-10-CM

## 2025-08-14 RX ORDER — ATORVASTATIN CALCIUM 20 MG/1
20 TABLET, FILM COATED ORAL DAILY
Qty: 30 TABLET | Refills: 1 | Status: SHIPPED | OUTPATIENT
Start: 2025-08-14

## 2025-09-24 ENCOUNTER — APPOINTMENT (OUTPATIENT)
Dept: PRIMARY CARE | Facility: CLINIC | Age: 50
End: 2025-09-24
Payer: COMMERCIAL